# Patient Record
Sex: FEMALE | Race: WHITE | NOT HISPANIC OR LATINO | Employment: OTHER | URBAN - METROPOLITAN AREA
[De-identification: names, ages, dates, MRNs, and addresses within clinical notes are randomized per-mention and may not be internally consistent; named-entity substitution may affect disease eponyms.]

---

## 2020-08-20 ENCOUNTER — NURSING HOME VISIT (OUTPATIENT)
Dept: FAMILY MEDICINE CLINIC | Facility: CLINIC | Age: 85
End: 2020-08-20
Payer: MEDICARE

## 2020-08-20 DIAGNOSIS — E03.8 OTHER SPECIFIED HYPOTHYROIDISM: ICD-10-CM

## 2020-08-20 DIAGNOSIS — F32.9 MAJOR DEPRESSIVE DISORDER, REMISSION STATUS UNSPECIFIED, UNSPECIFIED WHETHER RECURRENT: ICD-10-CM

## 2020-08-20 DIAGNOSIS — F03.91 DEMENTIA WITH BEHAVIORAL DISTURBANCE, UNSPECIFIED DEMENTIA TYPE (HCC): ICD-10-CM

## 2020-08-20 DIAGNOSIS — K21.9 GASTROESOPHAGEAL REFLUX DISEASE WITHOUT ESOPHAGITIS: Primary | ICD-10-CM

## 2020-08-20 PROCEDURE — 99309 SBSQ NF CARE MODERATE MDM 30: CPT | Performed by: INTERNAL MEDICINE

## 2020-08-22 VITALS
TEMPERATURE: 97.2 F | OXYGEN SATURATION: 97 % | DIASTOLIC BLOOD PRESSURE: 68 MMHG | HEART RATE: 73 BPM | RESPIRATION RATE: 20 BRPM | WEIGHT: 140 LBS | SYSTOLIC BLOOD PRESSURE: 142 MMHG

## 2020-08-22 PROBLEM — F03.91 DEMENTIA WITH BEHAVIORAL DISTURBANCE (HCC): Status: ACTIVE | Noted: 2020-08-22

## 2020-08-22 RX ORDER — QUETIAPINE FUMARATE 25 MG/1
0.5 TABLET, FILM COATED ORAL 2 TIMES DAILY
COMMUNITY

## 2020-08-22 RX ORDER — ASPIRIN 81 MG/1
81 TABLET, CHEWABLE ORAL DAILY
COMMUNITY

## 2020-08-22 RX ORDER — LEVOTHYROXINE SODIUM 0.05 MG/1
50 TABLET ORAL DAILY
COMMUNITY

## 2020-08-22 RX ORDER — FAMOTIDINE 20 MG/1
20 TABLET, FILM COATED ORAL DAILY
COMMUNITY

## 2020-08-22 RX ORDER — SENNA PLUS 8.6 MG/1
1 TABLET ORAL DAILY
COMMUNITY

## 2020-08-22 RX ORDER — CITALOPRAM HYDROBROMIDE 20 MG/10ML
5 SOLUTION, ORAL ORAL DAILY
COMMUNITY

## 2020-08-22 NOTE — PROGRESS NOTES
Progress Note    Patient location:  [facility]      Reason for visit:  Follow-up dementia with behavioral disturbances, hypothyroidism, status post PEG, GERD, depression and anxiety      Patients care was coordinated with nursing facility staff  Recent vitals, labs and updated medications were reviewed on [facility]  Past Medical, surgical, social, medication and allergy history and patients previous records reviewed  Problem List Items Addressed This Visit        Digestive    Gastroesophageal reflux disease without esophagitis - Primary     Continue famotidine         Relevant Medications    famotidine (PEPCID) 20 mg tablet       Endocrine    Other specified hypothyroidism     Continue levothyroxine         Relevant Medications    levothyroxine 50 mcg tablet       Nervous and Auditory    Dementia with behavioral disturbance (HCC)     Continue supportive treatment  Patient remains on quetiapine         Relevant Medications    QUEtiapine (SEROquel) 25 mg tablet    citalopram (CeleXA) 10 mg/5 mL suspension       Other    Major depressive disorder     Continue Citaolpram         Relevant Medications    QUEtiapine (SEROquel) 25 mg tablet    citalopram (CeleXA) 10 mg/5 mL suspension          HPI:  Patient is being seen for a follow-up visit today  She is new to our service  Past medical records reviewed  Patient has known history of dementia with behavioral disturbances, hypothyroidism, depression and anxiety, GERD, dysphagia status post PEG tube placement  Patient is doing okay  Remains confused  Irritable at times  Tolerating tube feeds  Patient has gained 6 lb since January/20  Patient is bed bound  Remains incontinent of bowel and bladder, requiring assistance with all ADLs  Review of Systems   Constitutional: Positive for fatigue  Negative for chills and fever  HENT: Negative for nosebleeds and rhinorrhea  Eyes: Negative for discharge and redness     Respiratory: Negative for cough, shortness of breath, wheezing and stridor  Cardiovascular: Negative for chest pain and leg swelling  Gastrointestinal: Negative for abdominal distention, abdominal pain, diarrhea and vomiting  Genitourinary: Negative for hematuria  Musculoskeletal: Positive for gait problem  Negative for arthralgias and back pain  Skin: Negative for pallor  Neurological: Positive for weakness (Generalized)  Negative for tremors, seizures and syncope  Psychiatric/Behavioral: Positive for agitation, behavioral problems and confusion  Past medical history:  Dementia with behavioral disturbances, hypothyroidism, depression and anxiety, GERD, coronary artery disease status post PCI, hypertension, dysphagia    Past surgical history:    Status post PEG  PCI in October 2012  Cholecystectomy      Family history:  Noncontributory  Allergies:  No known drug allergies  Social history:  Resident of [facility]  Current Outpatient Medications:     aspirin 81 mg chewable tablet, Chew 81 mg daily, Disp: , Rfl:     citalopram (CeleXA) 10 mg/5 mL suspension, Take 5 mg by mouth daily, Disp: , Rfl:     famotidine (PEPCID) 20 mg tablet, Take 20 mg by mouth daily, Disp: , Rfl:     levothyroxine 50 mcg tablet, Take 50 mcg by mouth daily, Disp: , Rfl:     QUEtiapine (SEROquel) 25 mg tablet, Take 0 5 tablets by mouth 2 (two) times a day, Disp: , Rfl:     senna (SENOKOT) 8 6 MG tablet, Take 1 tablet by mouth daily, Disp: , Rfl:     Updated list was reviewed in [facility]  Vitals:    08/20/20 1452   BP: 142/68   Pulse: 73   Resp: 20   Temp: (!) 97 2 °F (36 2 °C)   SpO2: 97%       Physical Exam  Constitutional:       General: She is not in acute distress  Appearance: She is well-developed  She is not diaphoretic  HENT:      Head: Normocephalic and atraumatic  Eyes:      General: No scleral icterus  Right eye: No discharge  Left eye: No discharge     Neck: Musculoskeletal: Neck supple  Cardiovascular:      Rate and Rhythm: Normal rate and regular rhythm  Pulmonary:      Effort: No respiratory distress  Breath sounds: No stridor  No wheezing  Abdominal:      General: There is no distension  Palpations: Abdomen is soft  Tenderness: There is no abdominal tenderness  There is no guarding  Skin:     Coloration: Skin is not jaundiced or pale  Neurological:      Mental Status: She is alert  Comments: Confused  Bed-bound  Noted to have generalized weakness   Psychiatric:      Comments: Irritable at times         Diagnostic Data:    Recent labs were reviewed    On 01/27/2020 sodium 139, potassium 4 3, BUN 34, creatinine 0 87, WBC count 7 7, hemoglobin 11 6    Additional Notes:   Check CBC, CMP, TSH, vitamin-D and B12 level

## 2020-11-06 ENCOUNTER — NURSING HOME VISIT (OUTPATIENT)
Dept: GERIATRICS | Facility: OTHER | Age: 85
End: 2020-11-06
Payer: MEDICARE

## 2020-11-06 VITALS
DIASTOLIC BLOOD PRESSURE: 72 MMHG | OXYGEN SATURATION: 97 % | WEIGHT: 138.6 LBS | RESPIRATION RATE: 16 BRPM | SYSTOLIC BLOOD PRESSURE: 141 MMHG | TEMPERATURE: 96.6 F | HEART RATE: 70 BPM

## 2020-11-06 DIAGNOSIS — F03.91 DEMENTIA WITH BEHAVIORAL DISTURBANCE, UNSPECIFIED DEMENTIA TYPE (HCC): Primary | ICD-10-CM

## 2020-11-06 DIAGNOSIS — K21.9 GASTROESOPHAGEAL REFLUX DISEASE WITHOUT ESOPHAGITIS: ICD-10-CM

## 2020-11-06 DIAGNOSIS — F32.9 MAJOR DEPRESSIVE DISORDER, REMISSION STATUS UNSPECIFIED, UNSPECIFIED WHETHER RECURRENT: ICD-10-CM

## 2020-11-06 DIAGNOSIS — E03.8 OTHER SPECIFIED HYPOTHYROIDISM: ICD-10-CM

## 2020-11-06 PROCEDURE — 99308 SBSQ NF CARE LOW MDM 20: CPT | Performed by: INTERNAL MEDICINE

## 2020-11-18 RX ORDER — ACETAMINOPHEN 325 MG/1
650 TABLET ORAL EVERY 6 HOURS PRN
COMMUNITY

## 2021-02-22 ENCOUNTER — NURSING HOME VISIT (OUTPATIENT)
Dept: GERIATRICS | Facility: OTHER | Age: 86
End: 2021-02-22
Payer: MEDICARE

## 2021-02-22 VITALS
DIASTOLIC BLOOD PRESSURE: 77 MMHG | TEMPERATURE: 97.4 F | HEART RATE: 62 BPM | WEIGHT: 134 LBS | OXYGEN SATURATION: 97 % | SYSTOLIC BLOOD PRESSURE: 138 MMHG | RESPIRATION RATE: 18 BRPM

## 2021-02-22 DIAGNOSIS — E03.8 OTHER SPECIFIED HYPOTHYROIDISM: Primary | ICD-10-CM

## 2021-02-22 DIAGNOSIS — N18.2 CKD (CHRONIC KIDNEY DISEASE) STAGE 2, GFR 60-89 ML/MIN: ICD-10-CM

## 2021-02-22 DIAGNOSIS — F32.9 MAJOR DEPRESSIVE DISORDER, REMISSION STATUS UNSPECIFIED, UNSPECIFIED WHETHER RECURRENT: ICD-10-CM

## 2021-02-22 DIAGNOSIS — R19.8 ABDOMINAL FULLNESS IN SUPRAPUBIC REGION: ICD-10-CM

## 2021-02-22 DIAGNOSIS — K21.9 GASTROESOPHAGEAL REFLUX DISEASE WITHOUT ESOPHAGITIS: ICD-10-CM

## 2021-02-22 DIAGNOSIS — F03.91 DEMENTIA WITH BEHAVIORAL DISTURBANCE, UNSPECIFIED DEMENTIA TYPE (HCC): ICD-10-CM

## 2021-02-22 PROBLEM — R13.19 OTHER DYSPHAGIA: Status: ACTIVE | Noted: 2021-02-22

## 2021-02-22 PROCEDURE — 99309 SBSQ NF CARE MODERATE MDM 30: CPT | Performed by: INTERNAL MEDICINE

## 2021-02-22 NOTE — PROGRESS NOTES
[facility]  [address]   [address] [address],[address] [place], [place], [address]  49     Progress Note  Code  [facility] 32    Patient Location      [facility] CARE    Reason for visit      FOLLOW-UP HYPOTHYROIDISM, DEMENTIA, GERD, DEPRESSION    Patients care was coordinated with nursing facility staff  Recent vitals, labs and updated medications were reviewed on [facility] system of facility        Problem List Items Addressed This Visit        Digestive    Gastroesophageal reflux disease without esophagitis      Stable on famotidine            Endocrine    Other specified hypothyroidism - Primary      Continue levothyroxine            Nervous and Auditory    Dementia with behavioral disturbance ([facility] [address] )      Continue supportive treatment  Patient remains on Quetiapine  No behavioral issues            Genitourinary    CKD (chronic kidney disease) stage 2, GFR 60-89 ml/min      Last creatinine was stable at 1 07 on 09/02/2020  Avoid hypotension and nephrotoxins  Continue to monitor periodically            Other    Major depressive disorder      Stable on citalopram         Abdominal fullness in suprapubic region     ? Distended bladder  Will check bladder scan / PVR  discussed with the nurse                   HPI      patient is being seen for follow-up visit today  Remains confused unable to provide any meaningful history  No acute issues have been reported by nursing staff  She has not had any fever chills or chest congestion  patient remains on tube feeds tolerating well  Patient has known history of dysphagia, protein calorie malnutrition and failure to thrive recent weights have been stable at around 1:30 a m  4 lbs      Review of Systems   Constitutional: Positive for fatigue  Negative for chills and fever  HENT: Negative for nosebleeds and rhinorrhea  Eyes: Negative for discharge and redness  Respiratory: Negative for cough, shortness of breath, wheezing and stridor  Cardiovascular: Negative for chest pain and leg swelling  Gastrointestinal: Negative for abdominal distention, abdominal pain, diarrhea and vomiting  Genitourinary: Negative for hematuria  Musculoskeletal: Positive for gait problem  Negative for arthralgias and back pain  Skin: Negative for pallor  Neurological: Positive for weakness (Generalized)  Negative for tremors, seizures and syncope  Psychiatric/Behavioral: Positive for confusion  Negative for agitation and behavioral problems  No past medical history on file  Current Outpatient Medications:     acetaminophen (TYLENOL) 325 mg tablet, Take 650 mg by mouth every 6 (six) hours as needed for mild pain, Disp: , Rfl:     aspirin 81 mg chewable tablet, Chew 81 mg daily, Disp: , Rfl:     citalopram (CeleXA) 10 mg/5 mL suspension, Take 5 mg by mouth daily, Disp: , Rfl:     famotidine (PEPCID) 20 mg tablet, Take 20 mg by mouth daily, Disp: , Rfl:     levothyroxine 50 mcg tablet, Take 50 mcg by mouth daily, Disp: , Rfl:     QUEtiapine (SEROquel) 25 mg tablet, Take 0 5 tablets by mouth 2 (two) times a day, Disp: , Rfl:     senna (SENOKOT) 8 6 MG tablet, Take 1 tablet by mouth daily, Disp: , Rfl:     [facility]  Vitals:    02/22/21 1457   BP: 138/77   Pulse: 62   Resp: 18   Temp: (!) 97 4 °F (36 3 °C)   SpO2: 97%       Physical Exam  Constitutional:       General: She is not in acute distress  Appearance: She is well-developed  She is not diaphoretic  HENT:      Head: Normocephalic and atraumatic  Nose: No rhinorrhea  Eyes:      General: No scleral icterus  Right eye: No discharge  Left eye: No discharge  Cardiovascular:      Rate and Rhythm: Normal rate and regular rhythm  Pulmonary:      Effort: No respiratory distress  Breath sounds: No stridor  No wheezing  Abdominal:      General: There is no distension        Comments: Suprapubic fullness possible distended bladder  PEG tube in place   Skin:     Coloration: Skin is not jaundiced or pale  Findings: No bruising  Neurological:      General: No focal deficit present  Mental Status: She is alert  She is disoriented  Diagnostic Data:     previous labs were reviewed  TSH was normal at 2 96 on 10/14/2020   labs done on 09/02/2020 revealed hemoglobin of 11 8, WBC count 7 3, platelet count 889, BUN 41, creatinine 1 07, sodium 141, potassium 4 6, vitamin-D 32, vitamin B12 greater than 1450    Additional Notes:    discussed with nurse  Check bladder scan    Gomez catheter to be left in place if PVR is greater than 350 cc    This note was electronically signed by Dr Yosvany Bolton

## 2021-02-22 NOTE — ASSESSMENT & PLAN NOTE
Last creatinine was stable at 1 07 on 09/02/2020  Avoid hypotension and nephrotoxins    Continue to monitor periodically

## 2021-08-25 ENCOUNTER — ANESTHESIA (EMERGENCY)
Dept: GASTROENTEROLOGY | Facility: HOSPITAL | Age: 86
End: 2021-08-25
Payer: COMMERCIAL

## 2021-08-25 ENCOUNTER — APPOINTMENT (EMERGENCY)
Dept: GASTROENTEROLOGY | Facility: HOSPITAL | Age: 86
End: 2021-08-25
Payer: COMMERCIAL

## 2021-08-25 ENCOUNTER — HOSPITAL ENCOUNTER (EMERGENCY)
Facility: HOSPITAL | Age: 86
Discharge: NURSING, REHAB OR LONG-TERM CARE | End: 2021-08-25
Payer: COMMERCIAL

## 2021-08-25 ENCOUNTER — ANESTHESIA EVENT (EMERGENCY)
Dept: GASTROENTEROLOGY | Facility: HOSPITAL | Age: 86
End: 2021-08-25
Payer: COMMERCIAL

## 2021-08-25 VITALS
WEIGHT: 134.48 LBS | HEART RATE: 61 BPM | DIASTOLIC BLOOD PRESSURE: 66 MMHG | RESPIRATION RATE: 18 BRPM | TEMPERATURE: 97.1 F | SYSTOLIC BLOOD PRESSURE: 116 MMHG | OXYGEN SATURATION: 92 %

## 2021-08-25 DIAGNOSIS — K94.23 PEG TUBE MALFUNCTION (HCC): Primary | ICD-10-CM

## 2021-08-25 LAB
ALBUMIN SERPL BCP-MCNC: 3.7 G/DL (ref 3.4–4.8)
ALP SERPL-CCNC: 132.7 U/L (ref 35–140)
ALT SERPL W P-5'-P-CCNC: 21 U/L (ref 5–54)
ANION GAP SERPL CALCULATED.3IONS-SCNC: 9 MMOL/L (ref 4–13)
APTT PPP: 25 SECONDS (ref 23–31)
AST SERPL W P-5'-P-CCNC: 19 U/L (ref 15–41)
BASOPHILS # BLD AUTO: 0.02 THOUSANDS/ΜL (ref 0–0.1)
BASOPHILS NFR BLD AUTO: 0 % (ref 0–1)
BILIRUB SERPL-MCNC: 0.36 MG/DL (ref 0.3–1.2)
BUN SERPL-MCNC: 32 MG/DL (ref 6–20)
CALCIUM SERPL-MCNC: 9.2 MG/DL (ref 8.4–10.2)
CHLORIDE SERPL-SCNC: 103 MMOL/L (ref 96–108)
CO2 SERPL-SCNC: 24 MMOL/L (ref 22–33)
CREAT SERPL-MCNC: 1.01 MG/DL (ref 0.4–1.1)
EOSINOPHIL # BLD AUTO: 0.12 THOUSAND/ΜL (ref 0–0.61)
EOSINOPHIL NFR BLD AUTO: 1 % (ref 0–6)
ERYTHROCYTE [DISTWIDTH] IN BLOOD BY AUTOMATED COUNT: 15.4 % (ref 11.6–15.1)
GFR SERPL CREATININE-BSD FRML MDRD: 50 ML/MIN/1.73SQ M
GLUCOSE SERPL-MCNC: 82 MG/DL (ref 65–140)
HCT VFR BLD AUTO: 41.5 % (ref 34.8–46.1)
HGB BLD-MCNC: 13.6 G/DL (ref 11.5–15.4)
IMM GRANULOCYTES # BLD AUTO: 0.02 THOUSAND/UL (ref 0–0.2)
IMM GRANULOCYTES NFR BLD AUTO: 0 % (ref 0–2)
INR PPP: 0.95 (ref 0.9–1.1)
LYMPHOCYTES # BLD AUTO: 2.13 THOUSANDS/ΜL (ref 0.6–4.47)
LYMPHOCYTES NFR BLD AUTO: 23 % (ref 14–44)
MCH RBC QN AUTO: 30.2 PG (ref 26.8–34.3)
MCHC RBC AUTO-ENTMCNC: 32.8 G/DL (ref 31.4–37.4)
MCV RBC AUTO: 92 FL (ref 82–98)
MONOCYTES # BLD AUTO: 0.46 THOUSAND/ΜL (ref 0.17–1.22)
MONOCYTES NFR BLD AUTO: 5 % (ref 4–12)
NEUTROPHILS # BLD AUTO: 6.54 THOUSANDS/ΜL (ref 1.85–7.62)
NEUTS SEG NFR BLD AUTO: 71 % (ref 43–75)
PLATELET # BLD AUTO: 181 THOUSANDS/UL (ref 149–390)
PMV BLD AUTO: 11.5 FL (ref 8.9–12.7)
POTASSIUM SERPL-SCNC: 4.5 MMOL/L (ref 3.5–5)
PROT SERPL-MCNC: 7.1 G/DL (ref 6.4–8.3)
PROTHROMBIN TIME: 10.8 SECONDS (ref 9.5–12.1)
RBC # BLD AUTO: 4.51 MILLION/UL (ref 3.81–5.12)
SODIUM SERPL-SCNC: 136 MMOL/L (ref 133–145)
WBC # BLD AUTO: 9.29 THOUSAND/UL (ref 4.31–10.16)

## 2021-08-25 PROCEDURE — 80053 COMPREHEN METABOLIC PANEL: CPT

## 2021-08-25 PROCEDURE — 85025 COMPLETE CBC W/AUTO DIFF WBC: CPT

## 2021-08-25 PROCEDURE — 85730 THROMBOPLASTIN TIME PARTIAL: CPT

## 2021-08-25 PROCEDURE — 36415 COLL VENOUS BLD VENIPUNCTURE: CPT

## 2021-08-25 PROCEDURE — 85610 PROTHROMBIN TIME: CPT

## 2021-08-25 PROCEDURE — 96361 HYDRATE IV INFUSION ADD-ON: CPT

## 2021-08-25 PROCEDURE — 96374 THER/PROPH/DIAG INJ IV PUSH: CPT

## 2021-08-25 PROCEDURE — 99283 EMERGENCY DEPT VISIT LOW MDM: CPT

## 2021-08-25 PROCEDURE — 99284 EMERGENCY DEPT VISIT MOD MDM: CPT

## 2021-08-25 PROCEDURE — 99212 OFFICE O/P EST SF 10 MIN: CPT | Performed by: INTERNAL MEDICINE

## 2021-08-25 PROCEDURE — 43246 EGD PLACE GASTROSTOMY TUBE: CPT | Performed by: INTERNAL MEDICINE

## 2021-08-25 RX ORDER — LIDOCAINE HYDROCHLORIDE 10 MG/ML
INJECTION, SOLUTION EPIDURAL; INFILTRATION; INTRACAUDAL; PERINEURAL AS NEEDED
Status: DISCONTINUED | OUTPATIENT
Start: 2021-08-25 | End: 2021-08-25

## 2021-08-25 RX ORDER — SODIUM CHLORIDE, SODIUM LACTATE, POTASSIUM CHLORIDE, CALCIUM CHLORIDE 600; 310; 30; 20 MG/100ML; MG/100ML; MG/100ML; MG/100ML
INJECTION, SOLUTION INTRAVENOUS CONTINUOUS PRN
Status: DISCONTINUED | OUTPATIENT
Start: 2021-08-25 | End: 2021-08-25

## 2021-08-25 RX ORDER — SODIUM CHLORIDE 9 MG/ML
500 INJECTION, SOLUTION INTRAVENOUS CONTINUOUS
Status: DISCONTINUED | OUTPATIENT
Start: 2021-08-25 | End: 2021-08-25 | Stop reason: HOSPADM

## 2021-08-25 RX ORDER — EPHEDRINE SULFATE 50 MG/ML
INJECTION INTRAVENOUS AS NEEDED
Status: DISCONTINUED | OUTPATIENT
Start: 2021-08-25 | End: 2021-08-25

## 2021-08-25 RX ORDER — CEFAZOLIN SODIUM 2 G/50ML
2000 SOLUTION INTRAVENOUS ONCE
Status: COMPLETED | OUTPATIENT
Start: 2021-08-25 | End: 2021-08-25

## 2021-08-25 RX ORDER — PROPOFOL 10 MG/ML
INJECTION, EMULSION INTRAVENOUS AS NEEDED
Status: DISCONTINUED | OUTPATIENT
Start: 2021-08-25 | End: 2021-08-25

## 2021-08-25 RX ADMIN — EPHEDRINE SULFATE 10 MG: 50 INJECTION, SOLUTION INTRAVENOUS at 12:41

## 2021-08-25 RX ADMIN — LIDOCAINE HYDROCHLORIDE 70 MG: 10 INJECTION, SOLUTION EPIDURAL; INFILTRATION; INTRACAUDAL; PERINEURAL at 12:35

## 2021-08-25 RX ADMIN — CEFAZOLIN SODIUM 2000 MG: 2 SOLUTION INTRAVENOUS at 12:34

## 2021-08-25 RX ADMIN — PROPOFOL 30 MG: 10 INJECTION, EMULSION INTRAVENOUS at 12:36

## 2021-08-25 RX ADMIN — SODIUM CHLORIDE, SODIUM LACTATE, POTASSIUM CHLORIDE, AND CALCIUM CHLORIDE: .6; .31; .03; .02 INJECTION, SOLUTION INTRAVENOUS at 12:32

## 2021-08-25 RX ADMIN — PROPOFOL 10 MG: 10 INJECTION, EMULSION INTRAVENOUS at 12:41

## 2021-08-25 RX ADMIN — PROPOFOL 30 MG: 10 INJECTION, EMULSION INTRAVENOUS at 12:37

## 2021-08-25 RX ADMIN — PROPOFOL 30 MG: 10 INJECTION, EMULSION INTRAVENOUS at 12:43

## 2021-08-25 RX ADMIN — PROPOFOL 30 MG: 10 INJECTION, EMULSION INTRAVENOUS at 12:38

## 2021-08-25 RX ADMIN — SODIUM CHLORIDE 500 ML/HR: 0.9 INJECTION, SOLUTION INTRAVENOUS at 09:53

## 2021-08-25 NOTE — ANESTHESIA PREPROCEDURE EVALUATION
Procedure:  EGD    Relevant Problems   ENDO   (+) Other specified hypothyroidism      GI/HEPATIC   (+) Gastroesophageal reflux disease without esophagitis   (+) Other dysphagia      /RENAL   (+) CKD (chronic kidney disease) stage 2, GFR 60-89 ml/min      NEURO/PSYCH   (+) Dementia with behavioral disturbance (HCC)   (+) Major depressive disorder        Physical Exam    Airway    Mallampati score: II  TM Distance: >3 FB  Neck ROM: full     Dental       Cardiovascular  Rhythm: regular,     Pulmonary  Breath sounds clear to auscultation,     Other Findings        Anesthesia Plan  ASA Score- 3     Anesthesia Type- IV sedation with anesthesia with ASA Monitors  Additional Monitors:   Airway Plan:           Plan Factors-Exercise tolerance (METS): <4 METS  Chart reviewed  EKG reviewed  Existing labs reviewed  Patient summary reviewed  Patient is not a current smoker  Induction-     Postoperative Plan-     Informed Consent- Anesthetic plan and risks discussed with healthcare power of   I personally reviewed this patient with the CRNA  Discussed and agreed on the Anesthesia Plan with the CRNA  Carine Molina

## 2021-08-25 NOTE — ED PROVIDER NOTES
History  Chief Complaint   Patient presents with    Feeding Tube Problem     Pt sent in for change of PEG tube, SNF stated there is a hole in it  80-year-old female presents from nursing facility secondary to problem with her feeding tube patient has chronic PEG tube in place patient has chronic dementia chronic behavior health issues failure to thrive according to staff tube was leaking from the outer tube were appears to be a hole in the tube  Tube has been in place for some time  No signs of any issues at the ostomy region patient has no bleeding there noted  Patient has no abdominal distention there is no reported nausea or vomiting  Patient essentially just sent here for tube replacement  Prior to Admission Medications   Prescriptions Last Dose Informant Patient Reported? Taking?    QUEtiapine (SEROquel) 25 mg tablet 8/24/2021 at Unknown time  Yes Yes   Sig: Take 0 5 tablets by mouth 2 (two) times a day   acetaminophen (TYLENOL) 325 mg tablet Unknown at Unknown time  Yes No   Sig: Take 650 mg by mouth every 6 (six) hours as needed for mild pain   aspirin 81 mg chewable tablet 8/24/2021 at Unknown time  Yes Yes   Sig: Chew 81 mg daily   citalopram (CeleXA) 10 mg/5 mL suspension 8/24/2021 at Unknown time  Yes Yes   Sig: Take 5 mg by mouth daily   famotidine (PEPCID) 20 mg tablet 8/24/2021 at Unknown time  Yes Yes   Sig: Take 20 mg by mouth daily   levothyroxine 50 mcg tablet 8/25/2021 at Unknown time  Yes Yes   Sig: Take 50 mcg by mouth daily   senna (SENOKOT) 8 6 MG tablet 8/24/2021 at Unknown time  Yes Yes   Sig: Take 1 tablet by mouth daily      Facility-Administered Medications: None       Past Medical History:   Diagnosis Date    Adult failure to thrive     Anxiety disorder     Aphasia     Atherosclerotic heart disease     Chronic kidney disease, stage 2 (mild)     Cognitive communication deficit     Constipation     COVID-19     Difficulty in walking, not elsewhere classified     Disease of thyroid gland     Dysphagia, oropharyngeal phase     Gastrostomy status (HCC)     GERD (gastroesophageal reflux disease)     Hypertension     MDD (major depressive disorder)     Muscle weakness     Need for assistance with personal care     Old MI (myocardial infarction)     [facility]     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 )     Vascular dementia without behavioral disturbance ([facility] )        Past Surgical History:   Procedure Laterality Date    ABDOMINAL SURGERY      PEG tube       History reviewed  No pertinent family history  I have reviewed and agree with the history as documented  E-Cigarette/Vaping     E-Cigarette/Vaping Substances     Social History     Tobacco Use    Smoking status: Unknown If Ever Smoked   Substance Use Topics    Alcohol use: Not on file    Drug use: Not on file       Review of Systems   Constitutional: Negative for chills and fever  HENT: Negative for congestion  Eyes: Negative for visual disturbance  Respiratory: Negative for shortness of breath  Cardiovascular: Negative for chest pain  Gastrointestinal: Negative for abdominal pain  Endocrine: Negative for cold intolerance  Genitourinary: Negative for frequency  Musculoskeletal: Negative for gait problem  Skin: Negative for rash  Neurological: Negative for dizziness  Psychiatric/Behavioral: Negative for behavioral problems and confusion  Physical Exam  Physical Exam  Vitals and nursing note reviewed  Constitutional:       Appearance: She is well-developed  HENT:      Head: Normocephalic and atraumatic  Eyes:      Conjunctiva/sclera: Conjunctivae normal       Pupils: Pupils are equal, round, and reactive to light  Cardiovascular:      Rate and Rhythm: Normal rate and regular rhythm        Heart sounds: Normal heart sounds  Pulmonary:      Effort: Pulmonary effort is normal       Breath sounds: Normal breath sounds  Abdominal:      General: Bowel sounds are normal       Palpations: Abdomen is soft  Comments: Patient has a PEG tube in place ostomy site appears to be intact   Musculoskeletal:         General: Normal range of motion  Cervical back: Normal range of motion and neck supple  Skin:     General: Skin is warm and dry  Capillary Refill: Capillary refill takes less than 2 seconds  Neurological:      Mental Status: She is oriented to person, place, and time     Psychiatric:         Behavior: Behavior normal          Vital Signs  ED Triage Vitals   Temperature Pulse Respirations Blood Pressure SpO2   08/25/21 0840 08/25/21 0840 08/25/21 0840 08/25/21 0840 08/25/21 0840   97 5 °F (36 4 °C) (!) 54 18 111/59 94 %      Temp Source Heart Rate Source Patient Position - Orthostatic VS BP Location FiO2 (%)   08/25/21 0840 08/25/21 0840 08/25/21 1008 08/25/21 1008 --   Oral Monitor Lying Left arm       Pain Score       --                  Vitals:    08/25/21 1154 08/25/21 1315 08/25/21 1320 08/25/21 1330   BP:  116/64 116/69 116/66   Pulse: (!) 52 66 71 61   Patient Position - Orthostatic VS:             Visual Acuity      ED Medications  Medications   sodium chloride 0 9 % infusion (500 mL/hr Intravenous New Bag 8/25/21 0953)   ceFAZolin (ANCEF) IVPB (premix in dextrose) 2,000 mg 50 mL (2,000 mg Intravenous Given 8/25/21 1234)       Diagnostic Studies  Results Reviewed     Procedure Component Value Units Date/Time    Comprehensive metabolic panel [[id number]]  (Abnormal) Collected: 08/25/21 1004    Lab Status: Final result Specimen: Blood from Arm, Right Updated: 08/25/21 1030     Sodium 136 mmol/L      Potassium 4 5 mmol/L      Chloride 103 mmol/L      CO2 24 mmol/L      ANION GAP 9 mmol/L      BUN 32 mg/dL      Creatinine 1 01 mg/dL      Glucose 82 mg/dL      Calcium 9 2 mg/dL      AST 19 U/L ALT 21 U/L      Alkaline Phosphatase 132 7 U/L      Total Protein 7 1 g/dL      Albumin 3 7 g/dL      Total Bilirubin 0 36 mg/dL      eGFR 50 ml/min/1 73sq m     Narrative:      National Kidney Disease Foundation guidelines for Chronic Kidney Disease (CKD):     Stage 1 with normal or high GFR (GFR > 90 mL/min/1 73 square meters)    Stage 2 Mild CKD (GFR = 60-89 mL/min/1 73 square meters)    Stage 3A Moderate CKD (GFR = 45-59 mL/min/1 73 square meters)    Stage 3B Moderate CKD (GFR = 30-44 mL/min/1 73 square meters)    Stage 4 Severe CKD (GFR = 15-29 mL/min/1 73 square meters)    Stage 5 End Stage CKD (GFR <15 mL/min/1 73 square meters)  Note: GFR calculation is accurate only with a steady state creatinine    Protime-INR [[id number]]  (Normal) Collected: 08/25/21 1004    Lab Status: Final result Specimen: Blood from Arm, Right Updated: 08/25/21 1023     Protime 10 8 seconds      INR 0 95    Narrative:      INR Reference Ranges:  No Anticoagulant, Normal:           0 9-1 1  Standard Dose, Oral Anticoagulant:  2 0-3 0  High Dose, Oral Anticoagulant:      2 5-3 5    APTT [[id number]]  (Normal) Collected: 08/25/21 1004    Lab Status: Final result Specimen: Blood from Arm, Right Updated: 08/25/21 1023     PTT 25 seconds     CBC and differential [[id number]]  (Abnormal) Collected: 08/25/21 1004    Lab Status: Final result Specimen: Blood from Arm, Right Updated: 08/25/21 1013     WBC 9 29 Thousand/uL      RBC 4 51 Million/uL      Hemoglobin 13 6 g/dL      Hematocrit 41 5 %      MCV 92 fL      MCH 30 2 pg      MCHC 32 8 g/dL      RDW 15 4 %      MPV 11 5 fL      Platelets 178 Thousands/uL      Neutrophils Relative 71 %      Immat GRANS % 0 %      Lymphocytes Relative 23 %      Monocytes Relative 5 %      Eosinophils Relative 1 %      Basophils Relative 0 %      Neutrophils Absolute 6 54 Thousands/µL      Immature Grans Absolute 0 02 Thousand/uL      Lymphocytes Absolute 2 13 Thousands/µL      Monocytes Absolute 0 46 Thousand/µL      Eosinophils Absolute 0 12 Thousand/µL      Basophils Absolute 0 02 Thousands/µL                  No orders to display              Procedures  Procedures         ED Course                                           MDM  Number of Diagnoses or Management Options  PEG tube malfunction [facility])  Diagnosis management comments: Case was discussed with GI on-call plan will be to replace her G-tube and a same-day procedure unit today she will be sent to the OR from the emergency department when called for  Patient went to same day see procedure unit she had the PEG tube placed without complications he will be discharged back to the nursing facility      Disposition  Final diagnoses:   PEG tube malfunction [facility])     Time reflects when diagnosis was documented in both MDM as applicable and the Disposition within this note     Time User Action Codes Description Comment    8/25/2021  9:32 AM Leora Cifuentes Add [K94 23] PEG tube malfunction ([facility] [address] )     8/25/2021  9:52 AM Celeste Acosta Modify [K94 23] PEG tube malfunction [facility])       ED Disposition     ED Disposition Condition Date/Time Comment    Discharge Stable Wed Aug 25, 2021  2:01 PM Svetlana Cote discharge to home/self care  Follow-up Information     Follow up With Specialties Details Why Gloria Sherwood MD Internal Medicine Schedule an appointment as soon as possible for a visit  As needed [facility] [address] [facility]  [address] [address]  [phone]            Patient's Medications   Discharge Prescriptions    No medications on file     No discharge procedures on file      PDMP Review     None          ED Provider  Electronically Signed by           Bharath Villagomez MD  08/25/21 1797       Bharath Villagomez MD  08/25/21 9008

## 2021-08-25 NOTE — H&P
History and Physical - [facility]  Tejal Espana 80 y o  female MRN: [id number]                  HPI: Tejal Espana is a 80y o  year old female who presents for malfunctioning G-tube      REVIEW OF SYSTEMS: Per the HPI, and otherwise unremarkable  Historical Information   Past Medical History:   Diagnosis Date    Adult failure to thrive     Anxiety disorder     Aphasia     Atherosclerotic heart disease     Chronic kidney disease, stage 2 (mild)     Cognitive communication deficit     Constipation     COVID-19     Difficulty in walking, not elsewhere classified     Disease of thyroid gland     Dysphagia, oropharyngeal phase     Gastrostomy status (HCC)     GERD (gastroesophageal reflux disease)     Hypertension     MDD (major depressive disorder)     Muscle weakness     Need for assistance with personal care     Old MI (myocardial infarction)     [facility] vascular disease ([facility] [address] )     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     [facility]     Unspecified severe protein-calorie malnutrition ([facility] )     Vascular dementia without behavioral disturbance [facility] )      Past Surgical History:   Procedure Laterality Date    ABDOMINAL SURGERY      PEG tube     Social History   Social History     Substance and Sexual Activity   Alcohol Use None     Social History     Substance and Sexual Activity   Drug Use Not on file     Social History     Tobacco Use   Smoking Status Unknown If Ever Smoked     History reviewed  No pertinent family history  Meds/Allergies     (Not in a hospital admission)      No Known Allergies    Objective     /69 (BP Location: Left arm)   Pulse (!) 52   Temp (!) 96 9 °F (36 1 °C) (Temporal)   Resp 16   Wt 61 kg (134 lb 7 7 oz)   SpO2 96%       PHYSICAL EXAM    Gen: NAD  CV: RRR  CHEST: Clear  ABD: soft, NT/ND  EXT: no edema      ASSESSMENT/PLAN:  This is a 80 y o  year old female here for EGD with PEG tube replacement, and she is stable and optimized for her procedure

## 2021-08-25 NOTE — QUICK NOTE
Was tiger texted from ER doctor regarding need for PEG tube change due to hole in tube- this is not a balloon PEG so therefore it cannot be exchanged in ER- spoke with [facility] staff and tube feedings have been held since 6:00 a m - we will plan for PEG tube exchange under sedation early this afternoon- spoke with Dr Jax Ruby who will perform procedure

## 2021-08-25 NOTE — DISCHARGE INSTRUCTIONS
PEG tube replaced no complications  Plan to DC home ok to use PEG tube  Follow up with PMD as needed

## 2021-08-25 NOTE — ED NOTES
Per Trina Miles at [facility], pt had her tube feed taken down at 0600 today       [address], RN  08/25/21 1219

## 2021-08-25 NOTE — ED NOTES
Pt returned to the unit Vss  Reported peg changed to 20fr, pt tolerated well, abd binder on        Thompson Colvin RN  08/25/21 3699

## 2025-01-18 NOTE — ANESTHESIA POSTPROCEDURE EVALUATION
Post-Op Assessment Note    CV Status:  Stable  Pain Score: 0    Pain management: adequate     Mental Status:  Sleepy and arousable   Hydration Status:  Stable   PONV Controlled:  None   Airway Patency:  Patent and adequate      Post Op Vitals Reviewed: Yes      Staff: CRNA         No complications documented      BP   116/73   Temp     Pulse 74   Resp   20   SpO2   97
bottom step/right rail up/right rail down